# Patient Record
Sex: MALE | Race: WHITE | Employment: UNEMPLOYED | URBAN - METROPOLITAN AREA
[De-identification: names, ages, dates, MRNs, and addresses within clinical notes are randomized per-mention and may not be internally consistent; named-entity substitution may affect disease eponyms.]

---

## 2023-03-01 ENCOUNTER — APPOINTMENT (OUTPATIENT)
Dept: CT IMAGING | Age: 33
End: 2023-03-01
Payer: COMMERCIAL

## 2023-03-01 ENCOUNTER — APPOINTMENT (OUTPATIENT)
Dept: GENERAL RADIOLOGY | Age: 33
End: 2023-03-01
Payer: COMMERCIAL

## 2023-03-01 ENCOUNTER — HOSPITAL ENCOUNTER (INPATIENT)
Age: 33
LOS: 1 days | Discharge: LEFT BEFORE DISCHARGE | End: 2023-03-02
Attending: STUDENT IN AN ORGANIZED HEALTH CARE EDUCATION/TRAINING PROGRAM | Admitting: INTERNAL MEDICINE
Payer: COMMERCIAL

## 2023-03-01 DIAGNOSIS — F11.93 OPIATE WITHDRAWAL (HCC): Primary | ICD-10-CM

## 2023-03-01 LAB
ALBUMIN SERPL-MCNC: 4.6 G/DL (ref 3.4–5)
ALP BLD-CCNC: 59 U/L (ref 40–129)
ALT SERPL-CCNC: 38 U/L (ref 10–40)
AMPHETAMINE SCREEN, URINE: ABNORMAL
ANION GAP SERPL CALCULATED.3IONS-SCNC: 25 MMOL/L (ref 3–16)
AST SERPL-CCNC: 33 U/L (ref 15–37)
BARBITURATE SCREEN URINE: ABNORMAL
BASOPHILS ABSOLUTE: 0 K/UL (ref 0–0.2)
BASOPHILS RELATIVE PERCENT: 0.2 %
BENZODIAZEPINE SCREEN, URINE: ABNORMAL
BILIRUB SERPL-MCNC: 0.6 MG/DL (ref 0–1)
BILIRUBIN DIRECT: <0.2 MG/DL (ref 0–0.3)
BILIRUBIN URINE: NEGATIVE
BILIRUBIN, INDIRECT: ABNORMAL MG/DL (ref 0–1)
BLOOD, URINE: NEGATIVE
BUN BLDV-MCNC: 12 MG/DL (ref 7–20)
CALCIUM SERPL-MCNC: 10.3 MG/DL (ref 8.3–10.6)
CANNABINOID SCREEN URINE: POSITIVE
CHLORIDE BLD-SCNC: 97 MMOL/L (ref 99–110)
CLARITY: CLEAR
CO2: 18 MMOL/L (ref 21–32)
COCAINE METABOLITE SCREEN URINE: POSITIVE
COLOR: YELLOW
CREAT SERPL-MCNC: 1.1 MG/DL (ref 0.9–1.3)
EOSINOPHILS ABSOLUTE: 0 K/UL (ref 0–0.6)
EOSINOPHILS RELATIVE PERCENT: 0 %
FENTANYL SCREEN, URINE: POSITIVE
GFR SERPL CREATININE-BSD FRML MDRD: >60 ML/MIN/{1.73_M2}
GLUCOSE BLD-MCNC: 156 MG/DL (ref 70–99)
GLUCOSE URINE: NEGATIVE MG/DL
HCT VFR BLD CALC: 43.6 % (ref 40.5–52.5)
HEMOGLOBIN: 14.9 G/DL (ref 13.5–17.5)
KETONES, URINE: 40 MG/DL
LACTIC ACID: 5.8 MMOL/L (ref 0.4–2)
LEUKOCYTE ESTERASE, URINE: NEGATIVE
LIPASE: 16 U/L (ref 13–60)
LYMPHOCYTES ABSOLUTE: 1.3 K/UL (ref 1–5.1)
LYMPHOCYTES RELATIVE PERCENT: 7.6 %
Lab: ABNORMAL
MCH RBC QN AUTO: 30.2 PG (ref 26–34)
MCHC RBC AUTO-ENTMCNC: 34.2 G/DL (ref 31–36)
MCV RBC AUTO: 88.4 FL (ref 80–100)
METHADONE SCREEN, URINE: POSITIVE
MICROSCOPIC EXAMINATION: ABNORMAL
MONOCYTES ABSOLUTE: 0.6 K/UL (ref 0–1.3)
MONOCYTES RELATIVE PERCENT: 3.9 %
NEUTROPHILS ABSOLUTE: 14.5 K/UL (ref 1.7–7.7)
NEUTROPHILS RELATIVE PERCENT: 88.3 %
NITRITE, URINE: NEGATIVE
OPIATE SCREEN URINE: ABNORMAL
OXYCODONE URINE: ABNORMAL
PDW BLD-RTO: 13.3 % (ref 12.4–15.4)
PH UA: 8
PH UA: 8 (ref 5–8)
PHENCYCLIDINE SCREEN URINE: ABNORMAL
PLATELET # BLD: 492 K/UL (ref 135–450)
PMV BLD AUTO: 7.5 FL (ref 5–10.5)
POTASSIUM SERPL-SCNC: 3.9 MMOL/L (ref 3.5–5.1)
PROTEIN UA: NEGATIVE MG/DL
RAPID INFLUENZA  B AGN: NEGATIVE
RAPID INFLUENZA A AGN: NEGATIVE
RBC # BLD: 4.93 M/UL (ref 4.2–5.9)
SARS-COV-2, NAAT: NOT DETECTED
SODIUM BLD-SCNC: 140 MMOL/L (ref 136–145)
SPECIFIC GRAVITY UA: 1.01 (ref 1–1.03)
TOTAL PROTEIN: 8.5 G/DL (ref 6.4–8.2)
URINE REFLEX TO CULTURE: ABNORMAL
URINE TYPE: ABNORMAL
UROBILINOGEN, URINE: 0.2 E.U./DL
WBC # BLD: 16.4 K/UL (ref 4–11)

## 2023-03-01 PROCEDURE — 6370000000 HC RX 637 (ALT 250 FOR IP): Performed by: EMERGENCY MEDICINE

## 2023-03-01 PROCEDURE — 2060000000 HC ICU INTERMEDIATE R&B

## 2023-03-01 PROCEDURE — 71045 X-RAY EXAM CHEST 1 VIEW: CPT

## 2023-03-01 PROCEDURE — 87804 INFLUENZA ASSAY W/OPTIC: CPT

## 2023-03-01 PROCEDURE — 83605 ASSAY OF LACTIC ACID: CPT

## 2023-03-01 PROCEDURE — 6360000002 HC RX W HCPCS: Performed by: NURSE PRACTITIONER

## 2023-03-01 PROCEDURE — 36415 COLL VENOUS BLD VENIPUNCTURE: CPT

## 2023-03-01 PROCEDURE — 80048 BASIC METABOLIC PNL TOTAL CA: CPT

## 2023-03-01 PROCEDURE — 80307 DRUG TEST PRSMV CHEM ANLYZR: CPT

## 2023-03-01 PROCEDURE — 87635 SARS-COV-2 COVID-19 AMP PRB: CPT

## 2023-03-01 PROCEDURE — 96375 TX/PRO/DX INJ NEW DRUG ADDON: CPT

## 2023-03-01 PROCEDURE — 85025 COMPLETE CBC W/AUTO DIFF WBC: CPT

## 2023-03-01 PROCEDURE — 6360000002 HC RX W HCPCS: Performed by: INTERNAL MEDICINE

## 2023-03-01 PROCEDURE — 80076 HEPATIC FUNCTION PANEL: CPT

## 2023-03-01 PROCEDURE — 6360000004 HC RX CONTRAST MEDICATION: Performed by: NURSE PRACTITIONER

## 2023-03-01 PROCEDURE — 81003 URINALYSIS AUTO W/O SCOPE: CPT

## 2023-03-01 PROCEDURE — 6360000002 HC RX W HCPCS: Performed by: EMERGENCY MEDICINE

## 2023-03-01 PROCEDURE — 6370000000 HC RX 637 (ALT 250 FOR IP): Performed by: INTERNAL MEDICINE

## 2023-03-01 PROCEDURE — 2580000003 HC RX 258: Performed by: INTERNAL MEDICINE

## 2023-03-01 PROCEDURE — 96376 TX/PRO/DX INJ SAME DRUG ADON: CPT

## 2023-03-01 PROCEDURE — 74177 CT ABD & PELVIS W/CONTRAST: CPT

## 2023-03-01 PROCEDURE — 96374 THER/PROPH/DIAG INJ IV PUSH: CPT

## 2023-03-01 PROCEDURE — 99285 EMERGENCY DEPT VISIT HI MDM: CPT

## 2023-03-01 PROCEDURE — 87040 BLOOD CULTURE FOR BACTERIA: CPT

## 2023-03-01 PROCEDURE — 83690 ASSAY OF LIPASE: CPT

## 2023-03-01 PROCEDURE — 6370000000 HC RX 637 (ALT 250 FOR IP): Performed by: NURSE PRACTITIONER

## 2023-03-01 PROCEDURE — 2580000003 HC RX 258: Performed by: NURSE PRACTITIONER

## 2023-03-01 RX ORDER — ENOXAPARIN SODIUM 100 MG/ML
40 INJECTION SUBCUTANEOUS DAILY
Status: DISCONTINUED | OUTPATIENT
Start: 2023-03-02 | End: 2023-03-02 | Stop reason: HOSPADM

## 2023-03-01 RX ORDER — ONDANSETRON 4 MG/1
4 TABLET, ORALLY DISINTEGRATING ORAL EVERY 8 HOURS PRN
Status: DISCONTINUED | OUTPATIENT
Start: 2023-03-01 | End: 2023-03-02 | Stop reason: HOSPADM

## 2023-03-01 RX ORDER — LORAZEPAM 2 MG/ML
1 INJECTION INTRAMUSCULAR
Status: COMPLETED | OUTPATIENT
Start: 2023-03-01 | End: 2023-03-01

## 2023-03-01 RX ORDER — SODIUM CHLORIDE, SODIUM LACTATE, POTASSIUM CHLORIDE, AND CALCIUM CHLORIDE .6; .31; .03; .02 G/100ML; G/100ML; G/100ML; G/100ML
1000 INJECTION, SOLUTION INTRAVENOUS ONCE
Status: COMPLETED | OUTPATIENT
Start: 2023-03-01 | End: 2023-03-01

## 2023-03-01 RX ORDER — LOPERAMIDE HYDROCHLORIDE 2 MG/1
2 CAPSULE ORAL 4 TIMES DAILY PRN
Status: DISCONTINUED | OUTPATIENT
Start: 2023-03-01 | End: 2023-03-02 | Stop reason: HOSPADM

## 2023-03-01 RX ORDER — LORAZEPAM 2 MG/ML
1 INJECTION INTRAMUSCULAR ONCE
Status: COMPLETED | OUTPATIENT
Start: 2023-03-01 | End: 2023-03-01

## 2023-03-01 RX ORDER — GABAPENTIN 300 MG/1
300 CAPSULE ORAL EVERY 8 HOURS PRN
Status: DISCONTINUED | OUTPATIENT
Start: 2023-03-01 | End: 2023-03-02 | Stop reason: HOSPADM

## 2023-03-01 RX ORDER — SODIUM CHLORIDE 0.9 % (FLUSH) 0.9 %
5-40 SYRINGE (ML) INJECTION PRN
Status: DISCONTINUED | OUTPATIENT
Start: 2023-03-01 | End: 2023-03-02 | Stop reason: HOSPADM

## 2023-03-01 RX ORDER — PROCHLORPERAZINE EDISYLATE 5 MG/ML
10 INJECTION INTRAMUSCULAR; INTRAVENOUS
Status: COMPLETED | OUTPATIENT
Start: 2023-03-01 | End: 2023-03-01

## 2023-03-01 RX ORDER — CLONIDINE HYDROCHLORIDE 0.1 MG/1
0.1 TABLET ORAL EVERY 6 HOURS PRN
Status: DISCONTINUED | OUTPATIENT
Start: 2023-03-01 | End: 2023-03-02 | Stop reason: HOSPADM

## 2023-03-01 RX ORDER — ONDANSETRON 2 MG/ML
4 INJECTION INTRAMUSCULAR; INTRAVENOUS EVERY 6 HOURS PRN
Status: DISCONTINUED | OUTPATIENT
Start: 2023-03-01 | End: 2023-03-02 | Stop reason: HOSPADM

## 2023-03-01 RX ORDER — ONDANSETRON 2 MG/ML
4 INJECTION INTRAMUSCULAR; INTRAVENOUS ONCE
Status: COMPLETED | OUTPATIENT
Start: 2023-03-01 | End: 2023-03-01

## 2023-03-01 RX ORDER — METHOCARBAMOL 750 MG/1
750 TABLET, FILM COATED ORAL EVERY 6 HOURS PRN
Status: DISCONTINUED | OUTPATIENT
Start: 2023-03-01 | End: 2023-03-02 | Stop reason: HOSPADM

## 2023-03-01 RX ORDER — HYDROXYZINE PAMOATE 25 MG/1
50 CAPSULE ORAL EVERY 8 HOURS PRN
Status: DISCONTINUED | OUTPATIENT
Start: 2023-03-01 | End: 2023-03-02 | Stop reason: HOSPADM

## 2023-03-01 RX ORDER — SODIUM CHLORIDE 0.9 % (FLUSH) 0.9 %
5-40 SYRINGE (ML) INJECTION EVERY 12 HOURS SCHEDULED
Status: DISCONTINUED | OUTPATIENT
Start: 2023-03-01 | End: 2023-03-02 | Stop reason: HOSPADM

## 2023-03-01 RX ORDER — SODIUM CHLORIDE 9 MG/ML
INJECTION, SOLUTION INTRAVENOUS PRN
Status: DISCONTINUED | OUTPATIENT
Start: 2023-03-01 | End: 2023-03-02 | Stop reason: HOSPADM

## 2023-03-01 RX ORDER — 0.9 % SODIUM CHLORIDE 0.9 %
1000 INTRAVENOUS SOLUTION INTRAVENOUS ONCE
Status: COMPLETED | OUTPATIENT
Start: 2023-03-01 | End: 2023-03-01

## 2023-03-01 RX ORDER — BUPRENORPHINE HYDROCHLORIDE AND NALOXONE HYDROCHLORIDE DIHYDRATE 2; .5 MG/1; MG/1
2 TABLET SUBLINGUAL PRN
Status: DISCONTINUED | OUTPATIENT
Start: 2023-03-01 | End: 2023-03-02 | Stop reason: HOSPADM

## 2023-03-01 RX ORDER — ACETAMINOPHEN 650 MG/1
650 SUPPOSITORY RECTAL EVERY 6 HOURS PRN
Status: DISCONTINUED | OUTPATIENT
Start: 2023-03-01 | End: 2023-03-02 | Stop reason: HOSPADM

## 2023-03-01 RX ORDER — DROPERIDOL 2.5 MG/ML
1.25 INJECTION, SOLUTION INTRAMUSCULAR; INTRAVENOUS ONCE
Status: COMPLETED | OUTPATIENT
Start: 2023-03-01 | End: 2023-03-01

## 2023-03-01 RX ORDER — KETOROLAC TROMETHAMINE 30 MG/ML
15 INJECTION, SOLUTION INTRAMUSCULAR; INTRAVENOUS ONCE
Status: COMPLETED | OUTPATIENT
Start: 2023-03-01 | End: 2023-03-01

## 2023-03-01 RX ORDER — POLYETHYLENE GLYCOL 3350 17 G/17G
17 POWDER, FOR SOLUTION ORAL DAILY PRN
Status: DISCONTINUED | OUTPATIENT
Start: 2023-03-01 | End: 2023-03-02 | Stop reason: HOSPADM

## 2023-03-01 RX ORDER — SODIUM CHLORIDE, SODIUM LACTATE, POTASSIUM CHLORIDE, CALCIUM CHLORIDE 600; 310; 30; 20 MG/100ML; MG/100ML; MG/100ML; MG/100ML
INJECTION, SOLUTION INTRAVENOUS CONTINUOUS
Status: DISCONTINUED | OUTPATIENT
Start: 2023-03-01 | End: 2023-03-02 | Stop reason: HOSPADM

## 2023-03-01 RX ORDER — ACETAMINOPHEN 325 MG/1
650 TABLET ORAL EVERY 6 HOURS PRN
Status: DISCONTINUED | OUTPATIENT
Start: 2023-03-01 | End: 2023-03-02 | Stop reason: HOSPADM

## 2023-03-01 RX ORDER — DIPHENHYDRAMINE HYDROCHLORIDE 50 MG/ML
25 INJECTION INTRAMUSCULAR; INTRAVENOUS ONCE
Status: COMPLETED | OUTPATIENT
Start: 2023-03-01 | End: 2023-03-01

## 2023-03-01 RX ADMIN — METHOCARBAMOL 750 MG: 750 TABLET ORAL at 23:20

## 2023-03-01 RX ADMIN — BUPRENORPHINE 12 MG: 8 TABLET SUBLINGUAL at 19:31

## 2023-03-01 RX ADMIN — KETOROLAC TROMETHAMINE 15 MG: 30 INJECTION, SOLUTION INTRAMUSCULAR; INTRAVENOUS at 10:20

## 2023-03-01 RX ADMIN — LOPERAMIDE HYDROCHLORIDE 2 MG: 2 CAPSULE ORAL at 16:57

## 2023-03-01 RX ADMIN — METHOCARBAMOL 750 MG: 750 TABLET ORAL at 16:57

## 2023-03-01 RX ADMIN — ONDANSETRON 4 MG: 2 INJECTION INTRAMUSCULAR; INTRAVENOUS at 10:20

## 2023-03-01 RX ADMIN — LORAZEPAM 1 MG: 2 INJECTION INTRAMUSCULAR; INTRAVENOUS at 12:33

## 2023-03-01 RX ADMIN — DIPHENHYDRAMINE HYDROCHLORIDE 25 MG: 50 INJECTION, SOLUTION INTRAMUSCULAR; INTRAVENOUS at 11:46

## 2023-03-01 RX ADMIN — ONDANSETRON 4 MG: 2 INJECTION INTRAMUSCULAR; INTRAVENOUS at 14:37

## 2023-03-01 RX ADMIN — BUPRENORPHINE AND NALOXONE 2 TABLET: 2; .5 TABLET SUBLINGUAL at 18:16

## 2023-03-01 RX ADMIN — IOPAMIDOL 75 ML: 755 INJECTION, SOLUTION INTRAVENOUS at 12:54

## 2023-03-01 RX ADMIN — CLONIDINE HYDROCHLORIDE 0.1 MG: 0.1 TABLET ORAL at 16:57

## 2023-03-01 RX ADMIN — SODIUM CHLORIDE 1000 ML: 9 INJECTION, SOLUTION INTRAVENOUS at 10:19

## 2023-03-01 RX ADMIN — BUPRENORPHINE AND NALOXONE 2 TABLET: 2; .5 TABLET SUBLINGUAL at 16:27

## 2023-03-01 RX ADMIN — PROCHLORPERAZINE EDISYLATE 10 MG: 5 INJECTION, SOLUTION INTRAMUSCULAR; INTRAVENOUS at 15:51

## 2023-03-01 RX ADMIN — DROPERIDOL 1.25 MG: 2.5 INJECTION, SOLUTION INTRAMUSCULAR; INTRAVENOUS at 10:37

## 2023-03-01 RX ADMIN — BUPRENORPHINE AND NALOXONE 2 TABLET: 2; .5 TABLET SUBLINGUAL at 14:46

## 2023-03-01 RX ADMIN — LORAZEPAM 1 MG: 2 INJECTION INTRAMUSCULAR; INTRAVENOUS at 18:11

## 2023-03-01 RX ADMIN — SODIUM CHLORIDE, POTASSIUM CHLORIDE, SODIUM LACTATE AND CALCIUM CHLORIDE 1000 ML: 600; 310; 30; 20 INJECTION, SOLUTION INTRAVENOUS at 19:32

## 2023-03-01 ASSESSMENT — ENCOUNTER SYMPTOMS
DIARRHEA: 1
ABDOMINAL PAIN: 1
VOMITING: 1
NAUSEA: 1
COUGH: 1
SHORTNESS OF BREATH: 1

## 2023-03-01 ASSESSMENT — PAIN DESCRIPTION - LOCATION: LOCATION: GENERALIZED

## 2023-03-01 ASSESSMENT — PAIN SCALES - GENERAL
PAINLEVEL_OUTOF10: 10
PAINLEVEL_OUTOF10: 9
PAINLEVEL_OUTOF10: 10
PAINLEVEL_OUTOF10: 9

## 2023-03-01 ASSESSMENT — PAIN - FUNCTIONAL ASSESSMENT: PAIN_FUNCTIONAL_ASSESSMENT: 0-10

## 2023-03-01 NOTE — DISCHARGE INSTRUCTIONS
Outpatient Mental Health Treatment Services    Mental Health Therapy and Psychiatry (Medication Management)  [facility]  Location: [address]  Phone: [phone]    [facility]  Location: [facility] in [facility] [address]. Phone: [phone]    [facility]  Location: Multiple offices in the [place] area  Phone: [phone] or [address]  Locations: Multiple locations in [place] and [place]  Phone: [phone]    [facility] [address] [facility]  Location: [facility]  Phone: [phone] [facility]   Location: Multiple offices in [place]   Phone: [phone] [facility], [address] and [address]  Location: offices in [facility]  Phone: [address]  Location: [facility]  Phone: [phone]    [facility]   Location: [address] [facility], [address] [place] [address]    Phone: [address]  Location: [address] [facility], [address] [facility].    Phone: [phone]    Mental Health Therapy Only, No Psychiatry (Medication Management)    [facility]  Location: [facility], [address] [place] [address]  Phone: [phone]    [facility]  Location: [address] [place] [address], [address] [place] [address]  Phone: [phone]
DISPLAY PLAN FREE TEXT

## 2023-03-01 NOTE — ED PROVIDER NOTES
[address] ENCOUNTER          NURSE PRACTITIONER NOTE       Date of evaluation: 3/1/2023        Chief Complaint     Emesis (Since last night, pt reports unable to move d/t generalized body aches )      History of Present Illness     Carolina Antonio is a 34 y.o. male who presents to the emergency department with a complaint of nausea vomiting diarrhea and all over body pain since last evening. Endorses diaphoresis, unknown if he has had a temperature. Denies sick contacts that he is aware of. States prior to last evening he was feeling in his normal state of health. Does endorse some streaks of blood in emesis and possibly in stool. Patient states he is unable to move due to all of her body aches. States he hurts everywhere. Endorses cough, cannot tell me if this is productive. Complains of intermittent shortness of breath and chest discomfort more after vomiting. Arrived via EMS, denies getting anything enroute for symptoms. He has a past medical history of Asthma. ASSESSMENT / PLAN  (MEDICAL DECISION MAKING)     INITIAL VITALS: BP: (!) 154/79, Temp: 98.1 °F (36.7 °C), Heart Rate: 98, Resp: 22, SpO2: 95 %     Carolina Antonio is a 34 y.o. male presents via EMS with a complaint of nausea vomiting diarrhea since last evening with associated body aches, arthralgias/myalgias. Patient appears very distressed on initial evaluation. He is diaphoretic, pale and actively vomiting. He has diffuse tenderness throughout chest wall and abdomen, afebrile and hemodynamically stable. Concern for possible viral etiology of symptoms, versus bacterial, versus opiate withdrawal with recent drug relapse the patient reporting taking/using multiple illicit substances a couple days ago including fentanyl after being \"clean\" for some time. Patient had an IV placed appropriate labwork was drawn including a CBC, BMP, LFTs, lipase, urinalysis, COVID, influenza.   Chest x-ray ordered due to complaint of cough and shortness of breath.  Patient given Zofran, Toradol and a liter of IV fluids initially for symptom control.  Minimal control of symptoms, given droperidol, Benadryl and eventually Ativan after minimal control of symptoms.  Patient continued to complain of pain everywhere and it \"locked jaw\" although patient noted to be talking without difficulty.  Laboratory evaluation significant for leukocytosis of 16.4, BMP with CO2 of 18, anion gap of 25 glucose of 156 stable creatinine of 1.1 with a BUN of 12.  Lipase unremarkable, LFTs with a total protein of 8.5 otherwise unremarkable.  COVID and influenza negative.  Given patient's concerning anion gap acidosis and uncontrolled symptoms, CT of abdomen and pelvis with IV contrast was ordered.    CT scan overall reassuring    Patient continues to be very symptomatic, had a moment where he was feeling improved and was no longer noticeably diaphoretic, however rebounded soon there after.  Cows score was done and noted to be 25.  Concern for opiate withdrawal causing patient's symptoms at this time.  Opiate withdrawal order set was placed and patient was given Suboxone and clonidine in the emergency department.  Do feel he warrants admission for ongoing evaluation and management of acute opiate withdrawal.    Case discussed with hospitalist who accepted patient for admission.     Medical Decision Making  Amount and/or Complexity of Data Reviewed  Labs: ordered.  Radiology: ordered.    Risk  Prescription drug management.  Decision regarding hospitalization.        This patient was also evaluated by the attending physician. All care plans werediscussed and agreed upon.     Clinical Impression     1. Opiate withdrawal (HCC)        Disposition     DISPOSITION Decision To Admit 03/01/2023 02:12:53 PM        Diagnostic Results And Other Data         RADIOLOGY:  CT ABDOMEN PELVIS W IV CONTRAST Additional Contrast? None   Final Result      Motion compromised study.  No acute findings. XR CHEST PORTABLE   Final Result   1. No acute cardiopulmonary abnormalities.           LABS:   Results for orders placed or performed during the hospital encounter of 03/01/23   COVID-19, Rapid    Specimen: Nasopharyngeal Swab   Result Value Ref Range    SARS-CoV-2, NAAT Not Detected Not Detected   Rapid Flu Swab    Specimen: Nasopharyngeal   Result Value Ref Range    Rapid Influenza A Ag Negative Negative    Rapid Influenza B Ag Negative Negative   CBC with Auto Differential   Result Value Ref Range    WBC 16.4 (H) 4.0 - 11.0 K/uL    RBC 4.93 4.20 - 5.90 M/uL    Hemoglobin 14.9 13.5 - 17.5 g/dL    Hematocrit 43.6 40.5 - 52.5 %    MCV 88.4 80.0 - 100.0 fL    MCH 30.2 26.0 - 34.0 pg    MCHC 34.2 31.0 - 36.0 g/dL    RDW 13.3 12.4 - 15.4 %    Platelets 430 (H) 513 - 450 K/uL    MPV 7.5 5.0 - 10.5 fL    Neutrophils % 88.3 %    Lymphocytes % 7.6 %    Monocytes % 3.9 %    Eosinophils % 0.0 %    Basophils % 0.2 %    Neutrophils Absolute 14.5 (H) 1.7 - 7.7 K/uL    Lymphocytes Absolute 1.3 1.0 - 5.1 K/uL    Monocytes Absolute 0.6 0.0 - 1.3 K/uL    Eosinophils Absolute 0.0 0.0 - 0.6 K/uL    Basophils Absolute 0.0 0.0 - 0.2 K/uL   Basic Metabolic Panel   Result Value Ref Range    Sodium 140 136 - 145 mmol/L    Potassium 3.9 3.5 - 5.1 mmol/L    Chloride 97 (L) 99 - 110 mmol/L    CO2 18 (L) 21 - 32 mmol/L    Anion Gap 25 (H) 3 - 16    Glucose 156 (H) 70 - 99 mg/dL    BUN 12 7 - 20 mg/dL    Creatinine 1.1 0.9 - 1.3 mg/dL    Est, Glom Filt Rate >60 >60    Calcium 10.3 8.3 - 10.6 mg/dL   Hepatic Function Panel   Result Value Ref Range    Total Protein 8.5 (H) 6.4 - 8.2 g/dL    Albumin 4.6 3.4 - 5.0 g/dL    Alkaline Phosphatase 59 40 - 129 U/L    ALT 38 10 - 40 U/L    AST 33 15 - 37 U/L    Total Bilirubin 0.6 0.0 - 1.0 mg/dL    Bilirubin, Direct <0.2 0.0 - 0.3 mg/dL    Bilirubin, Indirect see below 0.0 - 1.0 mg/dL   Lipase   Result Value Ref Range    Lipase 16.0 13.0 - 60.0 U/L         ED BEDSIDE ULTRASOUND:  No results found. MOST RECENT VITALS:  BP: (!) 153/79, Temp: 98.1 °F (36.7 °C), Heart Rate: 98, Resp: 18, SpO2: 99 %       ED Course     Nursing Notes, Past Medical Hx, Past Surgical Hx, Social Hx, Allergies, and Family Hx were reviewed. The patient was given the following medications:  Orders Placed This Encounter   Medications    ondansetron (ZOFRAN) injection 4 mg    0.9 % sodium chloride bolus    ketorolac (TORADOL) injection 15 mg    droperidol (INAPSINE) injection 1.25 mg    diphenhydrAMINE (BENADRYL) injection 25 mg    LORazepam (ATIVAN) injection 1 mg    iopamidol (ISOVUE-370) 76 % injection 75 mL    iohexol (OMNIPAQUE 240) Oral 20 mL    buprenorphine-naloxone (SUBOXONE) 2-0.5 MG SL tablet 2 tablet    methocarbamol (ROBAXIN) tablet 750 mg    cloNIDine (CATAPRES) tablet 0.1 mg    loperamide (IMODIUM) capsule 2 mg       CONSULTS:  IP CONSULT TO HOSPITALIST    Review of Systems     Review of Systems   Constitutional:  Positive for activity change, appetite change, chills, diaphoresis and fatigue. HENT: Negative. Respiratory:  Positive for cough and shortness of breath. Cardiovascular:  Positive for chest pain. Gastrointestinal:  Positive for abdominal pain, diarrhea, nausea and vomiting. Musculoskeletal:  Positive for arthralgias and myalgias. Allergic/Immunologic: Negative for immunocompromised state. Neurological:  Positive for headaches. Hematological:  Does not bruise/bleed easily. Psychiatric/Behavioral: Negative. Past Medical, Surgical, Family, and Social History     He has a past medical history of Asthma. He has no past surgical history on file. His family history is not on file. He reports that he has been smoking. He does not have any smokeless tobacco history on file. He reports current drug use. Drug: IV.    Medications     Previous Medications    No medications on file       Allergies     He has No Known Allergies.     Physical Exam     INITIAL VITALS: BP: (!) 154/79, Temp: 98.1 °F (36.7 °C), Heart Rate: 98, Resp: 22, SpO2: 95 %   Physical Exam  Vitals and nursing note reviewed. Constitutional:       General: He is in acute distress. Appearance: He is ill-appearing and diaphoretic. Comments: Actively dry heaving/vomiting, diaphoretic and pale   Cardiovascular:      Rate and Rhythm: Normal rate and regular rhythm. Pulses: Normal pulses. Heart sounds: Normal heart sounds. Pulmonary:      Effort: Pulmonary effort is normal. No respiratory distress. Breath sounds: Normal breath sounds. Abdominal:      General: Bowel sounds are normal. There is no distension. Palpations: Abdomen is soft. Tenderness: There is abdominal tenderness (generalized). There is no guarding or rebound. Musculoskeletal:         General: Normal range of motion. Cervical back: Normal range of motion and neck supple. Skin:     Coloration: Skin is pale. Neurological:      Mental Status: He is alert and oriented to person, place, and time.    Psychiatric:         Mood and Affect: Mood normal.         Behavior: Behavior normal.             BRAULIO Amato - NATHAN  03/01/23 6978

## 2023-03-01 NOTE — H&P
Hospital Medicine History & Physical      PCP: No primary care provider on file. Date of Admission: 3/1/2023    Date of Service: Pt seen/examined on 03/01/23 and Admitted to Inpatient with expected LOS greater than two midnights due to medical therapy. Chief Complaint:  Opioid withdrawal, unable to take PO    History Of Present Illness:      Fausto Augustin is a 34 y.o. male with past medical history as below, including IVDU, asthma, who presents with nausea, vomiting, diarrhea and \"withdrawal\" per patient. He reports he had been staying off drugs for some time but used fentanyl again a couple of days ago. Since last night he has been feeling shaky, having aches, diarrhea, sweats. Main complain is nausea and vomiting. Feels like his whole body hurts. Covid and Flu testing negative. Past Medical History:          Diagnosis Date    Asthma        Past Surgical History:      No past surgical history on file. Medications Prior to Admission:      Prior to Admission medications    Not on File       Allergies:  Patient has no known allergies. Social History:      The patient currently lives in private residence    TOBACCO:   reports that he has been smoking. He does not have any smokeless tobacco history on file. ETOH:   has no history on file for alcohol use. Family History:      Reviewed in detail and positive as follows:    No family history on file. REVIEW OF SYSTEMS:   Pertinent positives as noted in the HPI. All other systems reviewed and negative. PHYSICAL EXAM:    BP (!) 159/96   Pulse (!) 110   Temp 98.1 °F (36.7 °C) (Oral)   Resp 19   Ht 5' 9\" (1.753 m)   Wt 133 lb (60.3 kg)   SpO2 98%   BMI 19.64 kg/m²     General appearance: No apparent distress, appears stated age and cooperative. HEENT: Normal cephalic, atraumatic without obvious deformity. Pupils equal, round, and reactive to light. Extra ocular muscles intact. Conjunctivae/corneas clear.   Neck: Supple, with full range of motion. No jugular venous distention. Trachea midline. Respiratory:  Normal respiratory effort. Clear to auscultation, bilaterally without Rales/Wheezes/Rhonchi. Cardiovascular: Regular rate and rhythm with normal S1/S2 without murmurs, rubs or gallops. Abdomen: Soft, non-tender, non-distended with normal bowel sounds. Musculoskelatal: No clubbing, cyanosis or edema bilaterally. Full range of motion without deformity. Skin: Skin color, texture, turgor normal.  No rashes or lesions. Neurologic:  Neurovascularly intact without any focal sensory/motor deficits. Cranial nerves: II-XII intact, grossly non-focal. Tremulous. Psychiatric: Alert and oriented, thought content appropriate, normal insight    Labs:     Recent Labs     03/01/23  1010   WBC 16.4*   HGB 14.9   HCT 43.6   *     Recent Labs     03/01/23  1010      K 3.9   CL 97*   CO2 18*   BUN 12   CREATININE 1.1   CALCIUM 10.3     Recent Labs     03/01/23  1010   AST 33   ALT 38   BILIDIR <0.2   BILITOT 0.6   ALKPHOS 59     No results for input(s): INR in the last 72 hours. No results for input(s): [employer] in the last 72 hours. Urinalysis:    No results found for: NITRU, WBCUA, BACTERIA, RBCUA, BLOODU, SPECGRAV, GLUCOSEU    Studies:  CT ABDOMEN PELVIS W IV CONTRAST Additional Contrast? None   Final Result      Motion compromised study. No acute findings. XR CHEST PORTABLE   Final Result   1. No acute cardiopulmonary abnormalities.           ASSESSMENT:    Active Hospital Problems    Diagnosis Date Noted    Opioid withdrawal [facility]) [F11.93] 03/01/2023     Priority: Medium       PLAN:    Hx of IVDU  Opioid withdrawal  Last use couple of days ago, fentanyl    COWS protocol started in the ER but he is still having vomiting, was unable to take PO  Will try Compazine, then may be able to take suboxone    Continue COWS protocol   Start IV fluids     Start clears when can tolerate    Social work consultation    DVT Prophylaxis: Lovenox  Diet: No diet orders on file  Code Status: No Order    PT/OT Eval Status:     Dispo - Inpatient pending symptom improvement, taking PO, social work consult      [address] [place] DO

## 2023-03-01 NOTE — PROGRESS NOTES
Request sent to MD to reevaluate patient status. -170's, RR 30-40's, patient profusely sweating, complaining of \"lock jaw\", scoring 27 on COWS. MD to reevaluate, charge RN aware of status.

## 2023-03-01 NOTE — ED PROVIDER NOTES
ED Attending Attestation Note     Date of evaluation: 3/1/2023    This patient was seen by the advance practice provider. I have seen and examined the patient, agree with the workup, evaluation, management and diagnosis. The care plan has been discussed. My assessment reveals a 59-year-old male with a history of IV drug use most recently fentanyl about a week ago coming into the hospital for evaluation of nausea vomiting and abdominal pain. Patient states that he has generalized body aches as well. Patient states the symptoms started yesterday. On assessment he is alert and oriented. He was dry heaving and diaphoretic. On assessment his abdomen is soft nontender. His mucous membranes are moist at this time. Initial delay in getting labs due to patient being in hard IV stick. Patient continues to be diaphoretic on assessment. Even when he is not dry heaving. Given his acidosis and leukocytosis cross-sectional imaging was obtained which overall was unremarkable. The patient is stating that he recently relapsed with IV drug use and used fentanyl about a week ago. Given his tachycardia, diaphoresis and tremors and complains of muscle spasm patient was treated for opiate withdrawal here in the department. At this time he is unable to tolerate oral intake and will be admitted for further management given his overall clinical symptoms.      Ryan Lee MD  03/01/23 9257

## 2023-03-02 VITALS
SYSTOLIC BLOOD PRESSURE: 146 MMHG | BODY MASS INDEX: 19.23 KG/M2 | HEART RATE: 114 BPM | DIASTOLIC BLOOD PRESSURE: 94 MMHG | RESPIRATION RATE: 30 BRPM | HEIGHT: 69 IN | OXYGEN SATURATION: 98 % | TEMPERATURE: 97.7 F | WEIGHT: 129.85 LBS

## 2023-03-02 LAB
ANION GAP SERPL CALCULATED.3IONS-SCNC: 13 MMOL/L (ref 3–16)
BASOPHILS ABSOLUTE: 0 K/UL (ref 0–0.2)
BASOPHILS RELATIVE PERCENT: 0.1 %
BUN BLDV-MCNC: 19 MG/DL (ref 7–20)
CALCIUM SERPL-MCNC: 9.5 MG/DL (ref 8.3–10.6)
CHLORIDE BLD-SCNC: 102 MMOL/L (ref 99–110)
CO2: 26 MMOL/L (ref 21–32)
CREAT SERPL-MCNC: 0.8 MG/DL (ref 0.9–1.3)
EOSINOPHILS ABSOLUTE: 0 K/UL (ref 0–0.6)
EOSINOPHILS RELATIVE PERCENT: 0 %
GFR SERPL CREATININE-BSD FRML MDRD: >60 ML/MIN/{1.73_M2}
GLUCOSE BLD-MCNC: 98 MG/DL (ref 70–99)
HCT VFR BLD CALC: 38.4 % (ref 40.5–52.5)
HEMOGLOBIN: 13 G/DL (ref 13.5–17.5)
LYMPHOCYTES ABSOLUTE: 2 K/UL (ref 1–5.1)
LYMPHOCYTES RELATIVE PERCENT: 12.6 %
MCH RBC QN AUTO: 30.1 PG (ref 26–34)
MCHC RBC AUTO-ENTMCNC: 33.8 G/DL (ref 31–36)
MCV RBC AUTO: 89 FL (ref 80–100)
MONOCYTES ABSOLUTE: 1.1 K/UL (ref 0–1.3)
MONOCYTES RELATIVE PERCENT: 6.8 %
NEUTROPHILS ABSOLUTE: 12.5 K/UL (ref 1.7–7.7)
NEUTROPHILS RELATIVE PERCENT: 80.5 %
PDW BLD-RTO: 13.6 % (ref 12.4–15.4)
PLATELET # BLD: 376 K/UL (ref 135–450)
PMV BLD AUTO: 7.4 FL (ref 5–10.5)
POTASSIUM REFLEX MAGNESIUM: 3.8 MMOL/L (ref 3.5–5.1)
RBC # BLD: 4.32 M/UL (ref 4.2–5.9)
SODIUM BLD-SCNC: 141 MMOL/L (ref 136–145)
WBC # BLD: 15.5 K/UL (ref 4–11)

## 2023-03-02 PROCEDURE — 85025 COMPLETE CBC W/AUTO DIFF WBC: CPT

## 2023-03-02 PROCEDURE — 6360000002 HC RX W HCPCS: Performed by: INTERNAL MEDICINE

## 2023-03-02 PROCEDURE — 6370000000 HC RX 637 (ALT 250 FOR IP): Performed by: INTERNAL MEDICINE

## 2023-03-02 PROCEDURE — 80048 BASIC METABOLIC PNL TOTAL CA: CPT

## 2023-03-02 PROCEDURE — 6370000000 HC RX 637 (ALT 250 FOR IP): Performed by: EMERGENCY MEDICINE

## 2023-03-02 PROCEDURE — 6360000002 HC RX W HCPCS: Performed by: EMERGENCY MEDICINE

## 2023-03-02 PROCEDURE — 2580000003 HC RX 258: Performed by: INTERNAL MEDICINE

## 2023-03-02 PROCEDURE — 36415 COLL VENOUS BLD VENIPUNCTURE: CPT

## 2023-03-02 RX ORDER — LORAZEPAM 2 MG/ML
1 INJECTION INTRAMUSCULAR EVERY 4 HOURS PRN
Status: DISCONTINUED | OUTPATIENT
Start: 2023-03-02 | End: 2023-03-02 | Stop reason: HOSPADM

## 2023-03-02 RX ORDER — BUPRENORPHINE HYDROCHLORIDE AND NALOXONE HYDROCHLORIDE DIHYDRATE 2; .5 MG/1; MG/1
1 TABLET SUBLINGUAL
Status: COMPLETED | OUTPATIENT
Start: 2023-03-02 | End: 2023-03-02

## 2023-03-02 RX ORDER — LORAZEPAM 2 MG/ML
2 INJECTION INTRAMUSCULAR ONCE
Status: COMPLETED | OUTPATIENT
Start: 2023-03-02 | End: 2023-03-02

## 2023-03-02 RX ORDER — PROCHLORPERAZINE EDISYLATE 5 MG/ML
10 INJECTION INTRAMUSCULAR; INTRAVENOUS ONCE
Status: COMPLETED | OUTPATIENT
Start: 2023-03-02 | End: 2023-03-02

## 2023-03-02 RX ADMIN — SODIUM CHLORIDE, PRESERVATIVE FREE 10 ML: 5 INJECTION INTRAVENOUS at 08:09

## 2023-03-02 RX ADMIN — LORAZEPAM 1 MG: 2 INJECTION INTRAMUSCULAR; INTRAVENOUS at 09:15

## 2023-03-02 RX ADMIN — HYDROXYZINE PAMOATE 50 MG: 25 CAPSULE ORAL at 10:56

## 2023-03-02 RX ADMIN — ACETAMINOPHEN 650 MG: 325 TABLET ORAL at 01:14

## 2023-03-02 RX ADMIN — PROCHLORPERAZINE EDISYLATE 10 MG: 5 INJECTION, SOLUTION INTRAMUSCULAR; INTRAVENOUS at 05:20

## 2023-03-02 RX ADMIN — SODIUM CHLORIDE, PRESERVATIVE FREE 10 ML: 5 INJECTION INTRAVENOUS at 01:15

## 2023-03-02 RX ADMIN — CLONIDINE HYDROCHLORIDE 0.1 MG: 0.1 TABLET ORAL at 08:04

## 2023-03-02 RX ADMIN — BUPRENORPHINE AND NALOXONE 2 TABLET: 2; .5 TABLET SUBLINGUAL at 02:05

## 2023-03-02 RX ADMIN — LORAZEPAM 2 MG: 2 INJECTION INTRAMUSCULAR; INTRAVENOUS at 05:21

## 2023-03-02 RX ADMIN — METHOCARBAMOL 750 MG: 750 TABLET ORAL at 08:05

## 2023-03-02 RX ADMIN — GABAPENTIN 300 MG: 300 CAPSULE ORAL at 10:55

## 2023-03-02 RX ADMIN — ENOXAPARIN SODIUM 40 MG: 100 INJECTION SUBCUTANEOUS at 08:05

## 2023-03-02 RX ADMIN — ONDANSETRON 4 MG: 4 TABLET, ORALLY DISINTEGRATING ORAL at 01:14

## 2023-03-02 RX ADMIN — SODIUM CHLORIDE, POTASSIUM CHLORIDE, SODIUM LACTATE AND CALCIUM CHLORIDE: 600; 310; 30; 20 INJECTION, SOLUTION INTRAVENOUS at 01:33

## 2023-03-02 RX ADMIN — LOPERAMIDE HYDROCHLORIDE 2 MG: 2 CAPSULE ORAL at 01:14

## 2023-03-02 RX ADMIN — SODIUM CHLORIDE, POTASSIUM CHLORIDE, SODIUM LACTATE AND CALCIUM CHLORIDE: 600; 310; 30; 20 INJECTION, SOLUTION INTRAVENOUS at 12:09

## 2023-03-02 RX ADMIN — CLONIDINE HYDROCHLORIDE 0.1 MG: 0.1 TABLET ORAL at 01:14

## 2023-03-02 RX ADMIN — HYDROXYZINE PAMOATE 50 MG: 25 CAPSULE ORAL at 01:14

## 2023-03-02 RX ADMIN — LOPERAMIDE HYDROCHLORIDE 2 MG: 2 CAPSULE ORAL at 08:05

## 2023-03-02 RX ADMIN — BUPRENORPHINE 12 MG: 8 TABLET SUBLINGUAL at 09:59

## 2023-03-02 RX ADMIN — BUPRENORPHINE AND NALOXONE 1 TABLET: 2; .5 TABLET SUBLINGUAL at 09:19

## 2023-03-02 RX ADMIN — GABAPENTIN 300 MG: 300 CAPSULE ORAL at 01:14

## 2023-03-02 ASSESSMENT — PAIN SCALES - GENERAL
PAINLEVEL_OUTOF10: 5
PAINLEVEL_OUTOF10: 7
PAINLEVEL_OUTOF10: 9

## 2023-03-02 ASSESSMENT — PAIN DESCRIPTION - LOCATION
LOCATION: GENERALIZED
LOCATION: GENERALIZED

## 2023-03-02 ASSESSMENT — PAIN DESCRIPTION - DESCRIPTORS
DESCRIPTORS: SORE
DESCRIPTORS: ACHING;SORE
DESCRIPTORS: ACHING;DISCOMFORT

## 2023-03-02 ASSESSMENT — PAIN DESCRIPTION - ORIENTATION
ORIENTATION: OTHER (COMMENT)
ORIENTATION: OTHER (COMMENT)

## 2023-03-02 ASSESSMENT — PAIN DESCRIPTION - PAIN TYPE: TYPE: ACUTE PAIN

## 2023-03-02 ASSESSMENT — PAIN - FUNCTIONAL ASSESSMENT
PAIN_FUNCTIONAL_ASSESSMENT: ACTIVITIES ARE NOT PREVENTED
PAIN_FUNCTIONAL_ASSESSMENT: PREVENTS OR INTERFERES SOME ACTIVE ACTIVITIES AND ADLS
PAIN_FUNCTIONAL_ASSESSMENT: PREVENTS OR INTERFERES SOME ACTIVE ACTIVITIES AND ADLS

## 2023-03-02 ASSESSMENT — PAIN DESCRIPTION - FREQUENCY: FREQUENCY: CONTINUOUS

## 2023-03-02 ASSESSMENT — PAIN DESCRIPTION - ONSET: ONSET: ON-GOING

## 2023-03-02 NOTE — PLAN OF CARE
Problem: Pain  Goal: Verbalizes/displays adequate comfort level or baseline comfort level  Outcome: Progressing     Problem: Safety - Adult  Goal: Free from fall injury  Outcome: Progressing     Problem: Cardiovascular - Adult  Goal: Maintains optimal cardiac output and hemodynamic stability  3/2/2023 0613 by Ashia Cartagena RN  Outcome: Progressing  Flowsheets (Taken 3/2/2023 1853)  Maintains optimal cardiac output and hemodynamic stability:   Monitor blood pressure and heart rate   Monitor urine output and notify Licensed Independent Practitioner for values outside of normal range   Assess for signs of decreased cardiac output   Administer fluid and/or volume expanders as ordered

## 2023-03-02 NOTE — ED NOTES
ED TO INPATIENT SBAR HANDOFF    Patient Name: Mario Zambrano   :  1990  34 y.o. MRN:  [id number]  Preferred Name  Billy Scott  ED Room #:  B20/B20-20  Family/Caregiver Present no   Restraints no   Sitter no   Sepsis Risk Score Sepsis Risk Score: 0.69    Situation  Code Status: Full Code No additional code details. Allergies: Patient has no known allergies. Weight: Patient Vitals for the past 96 hrs (Last 3 readings):   Weight   23 0917 133 lb (60.3 kg)     Arrived from: home  Chief Complaint:   Chief Complaint   Patient presents with    Emesis     Since last night, pt reports unable to move d/t generalized body aches      Hospital Problem/Diagnosis:  Principal Problem:    Opioid withdrawal ([facility] [address]  Resolved Problems:    * No resolved hospital problems. *    Imaging:   CT ABDOMEN PELVIS W IV CONTRAST Additional Contrast? None   Final Result      Motion compromised study. No acute findings. XR CHEST PORTABLE   Final Result   1. No acute cardiopulmonary abnormalities. Abnormal labs:   Abnormal Labs Reviewed   CBC WITH AUTO DIFFERENTIAL - Abnormal; Notable for the following components:       Result Value    WBC 16.4 (*)     Platelets 259 (*)     Neutrophils Absolute 14.5 (*)     All other components within normal limits   BASIC METABOLIC PANEL - Abnormal; Notable for the following components:    Chloride 97 (*)     CO2 18 (*)     Anion Gap 25 (*)     Glucose 156 (*)     All other components within normal limits   HEPATIC FUNCTION PANEL - Abnormal; Notable for the following components:     Total Protein 8.5 (*)     All other components within normal limits   URINE DRUG SCREEN - Abnormal; Notable for the following components:    Cannabinoid Scrn, Ur POSITIVE (*)     Cocaine Metabolite Screen, Urine POSITIVE (*)     Methadone Screen, Urine POSITIVE (*)     FENTANYL SCREEN, URINE POSITIVE (*)     All other components within normal limits   URINALYSIS WITH REFLEX TO CULTURE - Abnormal; Notable for the following components:    Ketones, Urine 40 (*)     All other components within normal limits   LACTIC ACID - Abnormal; Notable for the following components:    Lactic Acid 5.8 (*)     All other components within normal limits    Narrative:     Bo Heath. [id number], PHONE DISCONNECTED/NO ANSWER AFTER  MULTIPLE ATTEMPTS     Critical values: yes     Abnormal Assessment Findings: Pt in active withdrawal from IVDU. States he relapsed on fentanyl a \"couple days ago\" and is now experiencing n/v/d with severe tremors, diaphoresis and anxiety. Background  History:   Past Medical History:   Diagnosis Date    Asthma        Assessment    Vitals/MEWS: MEWS Score: 5  Level of Consciousness: Alert (0)   Vitals:    03/01/23 1632 03/01/23 1730 03/01/23 2000 03/01/23 2040   BP: (!) 159/96 (!) 153/81 (!) 162/97 (!) 152/88   Pulse: (!) 110 (!) 138 (!) 147 (!) 126   Resp: 19 24 (!) 34 (!) 50   Temp:  98.8 °F (37.1 °C)     TempSrc:  Oral     SpO2: 98% 99% 98%    Weight:       Height:         FiO2 (%): none  O2 Flow Rate: O2 Device: None (Room air)    Cardiac Rhythm:    Pain Assessment: 9/10 [x] Verbal [] Lacy Jelena Scale  Pain Scale: Pain Assessment  Pain Assessment: 0-10  Pain Level: 9  Pain Location: Generalized  Last documented pain score (0-10 scale) Pain Level: 9  Last documented pain medication administered:   Mental Status: oriented, alert and coherent  NIH Score:    C-SSRS:    Bedside swallow:    [place] Coma Scale (GCS): [place] Coma Scale  Eye Opening: Spontaneous  Best Verbal Response: Oriented  Best Motor Response: Obeys commands  Isidra Coma Scale Score: 15  Active LDA's:   Peripheral IV 03/01/23 Left Forearm (Active)     PO Status: Regular  Pertinent or High Risk Medications/Drips: no   o If Yes, please provide details: none  Pending Blood Product Administration: no     You may also review the ED PT Care Timeline found under the Summary Nursing Index tab.     Recommendation    Pending orders: none  Plan for Discharge (if known): Additional Comments: pt coming in for withdrawal from IVDU. States he has not used in a week but relapsed on fentanyl \"a couple days ago\". Presenting with n/v/d with severe tremors, diaphoresis, anxiety and tachycardia. Overall obeys commands but has been maxed out on suboxone. Given multiple rounds of ativan.  A&Ox3  If any further questions, please call Sending RN at [phone]    Electronically signed by: Electronically signed by Francisco Driver RN on 3/1/2023 at 9:01 PM     Francisco Driver RN  03/01/23 4210       Francisco Driver RN  03/01/23 3156

## 2023-03-02 NOTE — PROGRESS NOTES
4 Eyes Admission Assessment     I agree as the admission nurse that 2 RN's have performed a thorough Head to Toe Skin Assessment on the patient. ALL assessment sites listed below have been assessed on admission. Areas assessed by both nurses: Valla Cordia  [x]   Head, Face, and Ears: Flushed face  [x]   Shoulders, Back, and Chest  [x]   Arms, Elbows, and Hands: flushed and warm to touch; LT ventral hand red/swollen puncture wound. RT inner arm red/swollen puncture wound. [x]   Coccyx, Sacrum, and Ischium: WNL  [x]   Legs, Feet, and Heels: WNL    Pt is generally flushed and red appearing with scattered bruising and IVDU puncture wounds. Does the Patient have Skin Breakdown?   No         Mj Prevention initiated:  no    Wound Care Orders initiated:  No      [facility] nurse consulted for Pressure Injury (Stage 3,4, Unstageable, DTI, NWPT, and Complex wounds) or Mj score 18 or lower:  No      Nurse 1 eSignature: Electronically signed by Freedom Khalil RN on 3/2/23 at 6:08 AM EST    **SHARE this note so that the co-signing nurse is able to place an eSignature**    Nurse 2 eSignature: Electronically signed by Mahnaz Garnett RN on 6/0/41 at 7:03 AM EST

## 2023-03-02 NOTE — ED PROVIDER NOTES
I was asked to see this patient because of continued opioid withdrawal.  His last use of fentanyl was 36 to 48 hours ago. He came him in withdrawal.  He received 4 mg of buprenorphine and he still felt bad. He says he was feeling bad before the buprenorphine and did not feel a whole lot worse after the 4 mg. When I walked into the room to talk to this patient per the request of the hospitalist he is in obvious withdrawal.  A very QuickCal score is approximately 24. Because of this I felt patient needs increased dose of buprenorphine. He did receive 4 mg therefore I will give 12 mg of buprenorphine here in the ED. This was shared decision making with the patient as we discussed precipitated withdrawal.    Patient did state to me that he has been on buprenorphine in the past as he has had multiple attempts long-term recovery. He says he has been using quite a bit over the last few weeks.      Yolis Larsen MD  03/01/23 [place]

## 2023-03-02 NOTE — PLAN OF CARE
Problem: Cardiovascular - Adult  Goal: Absence of cardiac dysrhythmias or at baseline  Outcome: Not Progressing  Flowsheets (Taken 3/2/2023 9471)  Absence of cardiac dysrhythmias or at baseline:   Monitor cardiac rate and rhythm   Assess for signs of decreased cardiac output  Note: Pt remains tachycardic       Problem: Gastrointestinal - Adult  Goal: Minimal or absence of nausea and vomiting  Outcome: Not Progressing  Flowsheets (Taken 3/2/2023 0613)  Minimal or absence of nausea and vomiting:   Administer IV fluids as ordered to ensure adequate hydration   Maintain NPO status until nausea and vomiting are resolved   Nasogastric tube to low intermittent suction as ordered   Administer ordered antiemetic medications as needed   Provide nonpharmacologic comfort measures as appropriate   Advance diet as tolerated, if ordered   Nutrition consult to assist patient with adequate nutrition and appropriate food choices  Note: Pt continues to have nausea. Problem: Gastrointestinal - Adult  Goal: Maintains adequate nutritional intake  Outcome: Not Progressing  Flowsheets (Taken 3/2/2023 4920)  Maintains adequate nutritional intake:   Monitor percentage of each meal consumed   Identify factors contributing to decreased intake, treat as appropriate   Assist with meals as needed   Monitor intake and output, weight and lab values  Note: Pt continues to have n/v and not tolerate intake well.       Problem: Cardiovascular - Adult  Goal: Maintains optimal cardiac output and hemodynamic stability  Outcome: Progressing  Flowsheets (Taken 3/2/2023 1168)  Maintains optimal cardiac output and hemodynamic stability:   Monitor blood pressure and heart rate   Monitor urine output and notify Licensed Independent Practitioner for values outside of normal range   Assess for signs of decreased cardiac output   Administer fluid and/or volume expanders as ordered     Problem: Gastrointestinal - Adult  Goal: Maintains or returns to baseline bowel function  Outcome: Progressing  Flowsheets (Taken 3/2/2023 2482)  Maintains or returns to baseline bowel function:   Assess bowel function   Encourage oral fluids to ensure adequate hydration   Administer IV fluids as ordered to ensure adequate hydration     Problem: Skin/Tissue Integrity - Adult  Goal: Skin integrity remains intact  Outcome: Progressing  Flowsheets (Taken 3/2/2023 0613)  Skin Integrity Remains Intact:   Monitor for areas of redness and/or skin breakdown   Assess vascular access sites hourly     Problem: Cardiovascular - Adult  Goal: Absence of cardiac dysrhythmias or at baseline  Outcome: Not Progressing  Flowsheets (Taken 3/2/2023 0613)  Absence of cardiac dysrhythmias or at baseline:   Monitor cardiac rate and rhythm   Assess for signs of decreased cardiac output  Note: Pt remains tachycardic       Problem: Gastrointestinal - Adult  Goal: Minimal or absence of nausea and vomiting  Outcome: Not Progressing  Flowsheets (Taken 3/2/2023 0613)  Minimal or absence of nausea and vomiting:   Administer IV fluids as ordered to ensure adequate hydration   Maintain NPO status until nausea and vomiting are resolved   Nasogastric tube to low intermittent suction as ordered   Administer ordered antiemetic medications as needed   Provide nonpharmacologic comfort measures as appropriate   Advance diet as tolerated, if ordered   Nutrition consult to assist patient with adequate nutrition and appropriate food choices  Note: Pt continues to have nausea. Goal: Maintains adequate nutritional intake  Outcome: Not Progressing  Flowsheets (Taken 3/2/2023 9653)  Maintains adequate nutritional intake:   Monitor percentage of each meal consumed   Identify factors contributing to decreased intake, treat as appropriate   Assist with meals as needed   Monitor intake and output, weight and lab values  Note: Pt continues to have n/v and not tolerate intake well.

## 2023-03-02 NOTE — PROGRESS NOTES
Pt. Discharged AMA. MD saw pt. At bedside and educated him of the risks of leaving without finishing treatment. IV was removed without complications. Pt. Is Alert x4.   Electronically signed by Elsy Weiss RN on 3/2/2023 at 1:21 PM

## 2023-03-02 NOTE — DISCHARGE SUMMARY
HOSPITALISTS DISCHARGE SUMMARY    Patient Demographics    Patient. Destinee Kepm  Date of Birth. 1990  MRN. [id number]     Primary care provider. No primary care provider on file. (Tel: None)    Admit date: 3/1/2023    Discharge date (blank if same as Note Date): Note Date: 3/2/2023     Reason for Hospitalization. Chief Complaint   Patient presents with    Emesis     Since last night, pt reports unable to move d/t generalized body aches          Significant Findings. Principal Problem:    Opioid withdrawal ([address]  Resolved Problems:    * No resolved hospital problems. *       Problems and results from this hospitalization that need follow up. Opioid withdrawal  Polysubstance use    Significant test results and incidental findings. CT ABDOMEN PELVIS W IV CONTRAST Additional Contrast? None   Final Result      Motion compromised study. No acute findings. XR CHEST PORTABLE   Final Result   1. No acute cardiopulmonary abnormalities. Invasive procedures and treatments. Chief Complaint:  Opioid withdrawal, unable to take PO     History Of Present Illness:       Destinee Kemp is a 34 y.o. male with past medical history as below, including IVDU, asthma, who presents with nausea, vomiting, diarrhea and \"withdrawal\" per patient. He reports he had been staying off drugs for some time but used fentanyl again a couple of days ago. Since last night he has been feeling shaky, having aches, diarrhea, sweats. Main complain is nausea and vomiting. Feels like his whole body hurts. Covid and Flu testing negative. [facility] Course. Patient was seen twice on the day of discharge where he left [address] He was still going through active withdrawal.  I offered the patient to increase his medicines to help him cope with the withdrawal symptoms but he was adamant on leaving. Risk explained and patient verbalized.   Advised the patient to come back to the hospital if he cannot cope up with the symptoms. His drug screen was positive for cocaine fentanyl methadone and cannabis. Consults. IP CONSULT TO HOSPITALIST  IP CONSULT TO SOCIAL WORK    Physical examination on discharge day. BP (!) 146/94   Pulse (!) 114   Temp 97.7 °F (36.5 °C) (Oral)   Resp 30   Ht 5' 9\" (1.753 m)   Wt 129 lb 13.6 oz (58.9 kg)   SpO2 98%   BMI 19.18 kg/m²   Tachycardia  Patient is alert awake oriented x3  Generalized shaking  Lungs clear to auscultation  Condition at time of discharge Stable     Medication instructions provided to patient at discharge. Medication List      You have not been prescribed any medications. Discharge recommendations given to patient. Follow Up. PCP  Disposition. AMA  Activity. activity as tolerated  Diet: ADULT DIET; Clear Liquid      Spent 25 minutes in discharge process.     Signed:  Franco Gonzalez MD     3/2/2023 12:49 PM

## 2023-03-05 LAB
BLOOD CULTURE, ROUTINE: NORMAL
CULTURE, BLOOD 2: NORMAL

## 2024-02-17 ENCOUNTER — HOSPITAL ENCOUNTER (EMERGENCY)
Age: 34
Discharge: HOME | End: 2024-02-17
Payer: COMMERCIAL

## 2024-02-17 VITALS
OXYGEN SATURATION: 96 % | TEMPERATURE: 98.1 F | DIASTOLIC BLOOD PRESSURE: 92 MMHG | HEART RATE: 99 BPM | BODY MASS INDEX: 19.2 KG/M2 | SYSTOLIC BLOOD PRESSURE: 143 MMHG | RESPIRATION RATE: 17 BRPM | WEIGHT: 130 LBS

## 2024-02-17 DIAGNOSIS — K14.6 TONGUE PAIN: Primary | ICD-10-CM

## 2024-02-17 PROCEDURE — 99283 EMERGENCY DEPT VISIT LOW MDM: CPT

## 2024-02-17 PROCEDURE — 6370000000 HC RX 637 (ALT 250 FOR IP): Performed by: PHYSICIAN ASSISTANT

## 2024-02-17 RX ORDER — LIDOCAINE HYDROCHLORIDE 20 MG/ML
15 SOLUTION OROPHARYNGEAL ONCE
Status: COMPLETED | OUTPATIENT
Start: 2024-02-17 | End: 2024-02-17

## 2024-02-17 RX ORDER — LIDOCAINE HYDROCHLORIDE 20 MG/ML
10 SOLUTION OROPHARYNGEAL
Qty: 240 ML | Refills: 0 | Status: SHIPPED | OUTPATIENT
Start: 2024-02-17

## 2024-02-17 RX ADMIN — Medication 15 ML: at 23:44

## 2024-02-17 ASSESSMENT — PAIN SCALES - GENERAL: PAINLEVEL_OUTOF10: 10

## 2024-02-17 ASSESSMENT — PAIN DESCRIPTION - ORIENTATION: ORIENTATION: LEFT

## 2024-02-18 NOTE — ED PROVIDER NOTES
EMERGENCY DEPARTMENT ENCOUNTER        Pt Name: Kirk Perez  MRN: [id number]  Birthdate 1990  Date of evaluation: 2/17/2024  Provider: Roxy Simeon PA-C  PCP: No primary care provider on file.    [place]. I have evaluated this patient.        Triage CHIEF COMPLAINT       Chief Complaint   Patient presents with    Mouth Lesions     Pt reports he was diagnosed with oral cancer 3 days ago by ENT in [place] and is having pain         HISTORY OF PRESENT ILLNESS      Chief Complaint: Tongue pain    Kirk Perez is a 33 y.o. male who presents with tongue pain.  He is from [facility]--states he is there for rehab for Fentanyl abuse.  He states he was just diagnosed with oral cancer by an ENT in [place] a few days ago.  He is complaining of pain to the tongue.  He has been taking Ibuprofen without relief.  Awaiting plan of care with ENT for treatment of the cancer.        Nursing Notes were all reviewed and agreed with or any disagreements were addressed in the HPI.    REVIEW OF SYSTEMS     CONSTITUTIONAL:  Denies fever.  EYES:  Denies visual changes.  HEAD:  Denies headache.  ENT:  Denies earache, nasal congestion, sore throat.  + tongue pain.  NECK:  Denies neck pain.  RESPIRATORY:  Denies any shortness of breath.  CARDIOVASCULAR:  Denies chest pain.  GI:  Denies nausea or vomiting.    :  Denies urinary symptoms.  MUSCULOSKELETAL:  Denies extremity pain or swelling.  BACK:  Denies back pain.  INTEGUMENT:  Denies skin changes.  LYMPHATIC:  Denies lymphadenopathy.  NEUROLOGIC:  Denies any numbness/tingling.  PSYCHIATRIC:  Denies SI/HI.    PAST MEDICAL HISTORY     Past Medical History:   Diagnosis Date    Asthma        SURGICAL HISTORY   History reviewed. No pertinent surgical history.    CURRENTMEDICATIONS       Previous Medications    No medications on file       ALLERGIES     Patient has no known allergies.    FAMILYHISTORY     History reviewed. No pertinent family history.     SOCIAL HISTORY

## 2024-02-19 ENCOUNTER — HOSPITAL ENCOUNTER (EMERGENCY)
Age: 34
Discharge: HOME | End: 2024-02-20
Attending: EMERGENCY MEDICINE
Payer: COMMERCIAL

## 2024-02-19 DIAGNOSIS — K14.6 TONGUE PAIN: ICD-10-CM

## 2024-02-19 DIAGNOSIS — K14.8 TONGUE LESION: Primary | ICD-10-CM

## 2024-02-19 PROCEDURE — 6360000002 HC RX W HCPCS: Performed by: EMERGENCY MEDICINE

## 2024-02-19 PROCEDURE — 96372 THER/PROPH/DIAG INJ SC/IM: CPT

## 2024-02-19 PROCEDURE — 6370000000 HC RX 637 (ALT 250 FOR IP): Performed by: EMERGENCY MEDICINE

## 2024-02-19 PROCEDURE — 99284 EMERGENCY DEPT VISIT MOD MDM: CPT

## 2024-02-19 RX ORDER — DIPHENHYDRAMINE HYDROCHLORIDE AND LIDOCAINE HYDROCHLORIDE AND ALUMINUM HYDROXIDE AND MAGNESIUM HYDRO
5 KIT ONCE
Status: COMPLETED | OUTPATIENT
Start: 2024-02-19 | End: 2024-02-19

## 2024-02-19 RX ADMIN — DIPHENHYDRAMINE HYDROCHLORIDE AND LIDOCAINE HYDROCHLORIDE AND ALUMINUM HYDROXIDE AND MAGNESIUM HYDRO 5 ML: KIT at 23:54

## 2024-02-19 RX ADMIN — HYDROMORPHONE HYDROCHLORIDE 1 MG: 1 INJECTION, SOLUTION INTRAMUSCULAR; INTRAVENOUS; SUBCUTANEOUS at 23:54

## 2024-02-20 VITALS
SYSTOLIC BLOOD PRESSURE: 142 MMHG | TEMPERATURE: 97.8 F | RESPIRATION RATE: 18 BRPM | DIASTOLIC BLOOD PRESSURE: 107 MMHG | HEART RATE: 99 BPM | OXYGEN SATURATION: 100 %

## 2024-02-20 PROCEDURE — 6370000000 HC RX 637 (ALT 250 FOR IP): Performed by: EMERGENCY MEDICINE

## 2024-02-20 RX ORDER — OXYCODONE HYDROCHLORIDE AND ACETAMINOPHEN 5; 325 MG/1; MG/1
1 TABLET ORAL ONCE
Status: COMPLETED | OUTPATIENT
Start: 2024-02-20 | End: 2024-02-20

## 2024-02-20 RX ADMIN — OXYCODONE HYDROCHLORIDE AND ACETAMINOPHEN 1 TABLET: 5; 325 TABLET ORAL at 00:30

## 2024-02-20 ASSESSMENT — PAIN SCALES - GENERAL: PAINLEVEL_OUTOF10: 7

## 2024-02-20 ASSESSMENT — PAIN DESCRIPTION - DESCRIPTORS: DESCRIPTORS: ACHING

## 2024-02-20 ASSESSMENT — PAIN DESCRIPTION - LOCATION: LOCATION: MOUTH

## 2024-02-20 NOTE — ED PROVIDER NOTES
CHIEF COMPLAINT    Chief Complaint   Patient presents with    Mouth Lesions     Patient has recent diagnosis of mouth cancer and has a hole in tongue and is from [facility], still in a lot of pain.     Oral Swelling     HPI  Kirk Perez is a 33 y.o. male who presents to the ED with complaints of ongoing tongue pain.  Patient has known tongue lesion with biopsy proven cancerous mass.  He is following with Dr. Popeye Middleton for of ENT through [facility] for this tongue lesion he was seen in the emergency department on 02/13 as well as 02/17 for these pain complaints.  He had CT imaging performed on 02/13 which was without any evidence of drainable fluid collection within the oropharynx.  He was placed on clindamycin.  He was given viscous lidocaine to help with pain management on 02/17.  He is currently residing at [facility] rehab facility for history of fentanyl abuse.  He was previously on Suboxone but has not had any Suboxone for 3 days.  His pain is described as a stabbing throbbing pain rated 7/10 exacerbated with eating and drinking.  He states the lidocaine makes it slightly better.  Pain does not radiate.  Denies fevers, chills, nausea, vomiting, difficulty swallowing, chest pain, shortness of      REVIEW OF SYSTEMS  Constitutional: No fever, chills  Eye: No visual changes  HENT: No earache or sore throat.  Complains of tongue lesion and tongue pain  Resp: No SOB or productive cough.  Cardio: No chest pain or palpitations.  GI: No abdominal pain, nausea, vomiting, constipation or diarrhea. No melena.  : No dysuria, urgency or frequency.  Endocrine: No heat intolerance, no cold intolerance, no polydipsia   Lymphatics: No adenopathy  Musculoskeletal: No new muscle aches or joint pain.  Neuro: No headaches.  Psych: No homicidal or suicidal thoughts  Skin: No rash, No itching.  ?  ?  PAST MEDICAL HISTORY  Past Medical History:   Diagnosis Date    Asthma      FAMILY HISTORY  History